# Patient Record
Sex: MALE | Race: WHITE | Employment: FULL TIME | URBAN - METROPOLITAN AREA
[De-identification: names, ages, dates, MRNs, and addresses within clinical notes are randomized per-mention and may not be internally consistent; named-entity substitution may affect disease eponyms.]

---

## 2022-04-06 ENCOUNTER — HOSPITAL ENCOUNTER (EMERGENCY)
Age: 62
Discharge: HOME | End: 2022-04-06
Payer: COMMERCIAL

## 2022-04-06 VITALS
OXYGEN SATURATION: 95 % | RESPIRATION RATE: 18 BRPM | DIASTOLIC BLOOD PRESSURE: 90 MMHG | SYSTOLIC BLOOD PRESSURE: 150 MMHG | HEART RATE: 82 BPM | TEMPERATURE: 98.3 F

## 2022-04-06 DIAGNOSIS — Z23 NEED FOR TETANUS BOOSTER: ICD-10-CM

## 2022-04-06 DIAGNOSIS — S01.01XA LACERATION OF SCALP, INITIAL ENCOUNTER: Primary | ICD-10-CM

## 2022-04-06 PROCEDURE — 90471 IMMUNIZATION ADMIN: CPT | Performed by: PHYSICIAN ASSISTANT

## 2022-04-06 PROCEDURE — 2500000003 HC RX 250 WO HCPCS: Performed by: PHYSICIAN ASSISTANT

## 2022-04-06 PROCEDURE — 12001 RPR S/N/AX/GEN/TRNK 2.5CM/<: CPT

## 2022-04-06 PROCEDURE — 6360000002 HC RX W HCPCS: Performed by: PHYSICIAN ASSISTANT

## 2022-04-06 PROCEDURE — 99202 OFFICE O/P NEW SF 15 MIN: CPT

## 2022-04-06 PROCEDURE — 90715 TDAP VACCINE 7 YRS/> IM: CPT | Performed by: PHYSICIAN ASSISTANT

## 2022-04-06 RX ORDER — AMLODIPINE BESYLATE 10 MG/1
10 TABLET ORAL DAILY
COMMUNITY

## 2022-04-06 RX ORDER — ALBUTEROL SULFATE 90 UG/1
2 AEROSOL, METERED RESPIRATORY (INHALATION) EVERY 6 HOURS PRN
COMMUNITY

## 2022-04-06 RX ORDER — VALSARTAN 320 MG/1
320 TABLET ORAL DAILY
COMMUNITY

## 2022-04-06 RX ORDER — PRAVASTATIN SODIUM 10 MG
40 TABLET ORAL DAILY
COMMUNITY

## 2022-04-06 RX ORDER — LIDOCAINE HYDROCHLORIDE AND EPINEPHRINE 10; 10 MG/ML; UG/ML
20 INJECTION, SOLUTION INFILTRATION; PERINEURAL ONCE
Status: COMPLETED | OUTPATIENT
Start: 2022-04-06 | End: 2022-04-06

## 2022-04-06 RX ADMIN — LIDOCAINE HYDROCHLORIDE,EPINEPHRINE BITARTRATE 20 ML: 10; .01 INJECTION, SOLUTION INFILTRATION; PERINEURAL at 15:48

## 2022-04-06 RX ADMIN — TETANUS TOXOID, REDUCED DIPHTHERIA TOXOID AND ACELLULAR PERTUSSIS VACCINE, ADSORBED 0.5 ML: 5; 2.5; 8; 8; 2.5 SUSPENSION INTRAMUSCULAR at 15:56

## 2022-04-06 ASSESSMENT — PAIN - FUNCTIONAL ASSESSMENT: PAIN_FUNCTIONAL_ASSESSMENT: 0-10

## 2022-04-06 ASSESSMENT — PAIN SCALES - GENERAL
PAINLEVEL_OUTOF10: 2
PAINLEVEL_OUTOF10: 2

## 2022-04-06 ASSESSMENT — PAIN DESCRIPTION - DESCRIPTORS: DESCRIPTORS: ACHING

## 2022-04-06 ASSESSMENT — PAIN DESCRIPTION - PAIN TYPE: TYPE: ACUTE PAIN

## 2022-04-06 ASSESSMENT — PAIN DESCRIPTION - LOCATION: LOCATION: HEAD

## 2022-04-06 NOTE — ED PROVIDER NOTES
[id number] [facility] [place]  Department of Emergency Medicine   ED  Encounter Note  Admit Date/RoomTime: 2022  3:18 PM  ED Room:   NAME: Jonathan Dave  : 1960  MRN: [id number]     Chief Complaint:  Laceration (on top of his head)    HISTORY OF LITTLE Anguiano is a 65 y.o. male who presents to the ED with a laceration to the top of his head. This is a work related injury. Patient states he was moving a log splitter with a carlos. Kicked out at him and the carlos came back and hit him to the top of his head. He presents with a laceration to top of his head on the right side. Patient denies any loss of consciousness. He denies headache. States he was just stunned for less than a minute. Denies any dizziness. Denies any neck pain. Patient is not on blood thinners. He only complains of localized tenderness around the laceration. He rates it a 2 out of 10. ROS   Pertinent positives and negatives are stated within HPI, all other systems reviewed and are negative. Past Medical History:  has no past medical history on file. Surgical History:  has no past surgical history on file. Social History:  reports that he has been smoking. He has never used smokeless tobacco.    Family History: family history is not on file. Allergies: Penicillins    PHYSICAL EXAM   Oxygen Saturation Interpretation: Normal on room air analysis. ED Triage Vitals [22 1518]   BP Temp Temp Source Pulse Resp SpO2 Height Weight   -- 98.3 °F (36.8 °C) Infrared 82 18 95 % -- --       General:  NAD. Alert and Oriented. Well-appearing. Skin:  Warm, dry. No rashes. Head:  Normocephalic. Patient has a 3 cm linear laceration to the right parietal scalp. Eyes:  EOMI. Conjunctiva normal.  Pupils equal round and reactive to light. ENT:  Oral mucosa moist.  Airway patent. Neck:  Supple. Normal ROM. Respiratory:  No respiratory distress. No labored breathing.   Lungs clear without rales, rhonchi or wheezing. Cardiovascular:  Regular rate. No Murmur. No peripheral edema. Extremities warm and good color. Extremities:  Normal ROM. Nontender to palpation. Atraumatic. Back:  Normal ROM. Nontender to palpation. Neuro:  Alert and Oriented to person, place, time and situation. Normal LOC. Moves all extremities. Speech fluent. Psych:  Calm and Cooperative. Normal thought process. Normal judgement. Lab / Imaging Results   (All laboratory and radiology results have been personally reviewed by myself)  Labs:  No results found for this visit on 04/06/22. Imaging: All Radiology results interpreted by Radiologist unless otherwise noted. No orders to display       ED Course / Medical Decision Making     Medications   lidocaine-EPINEPHrine 1 %-[id number] injection 20 mL (has no administration in time range)   Tetanus-Diphth-Acell Pertussis (BOOSTRIX) injection 0.5 mL (has no administration in time range)        Re-examination:  4/6/22       Time:   Patients condition . Consult(s):   None    Procedure(s):  PROCEDURE NOTE FOR LACERATION REPAIR    PERFORMED BY EMORY DURHAM PA-C      TIME:  1540  LOCATION: Right parietal scalp  LENGTH: 3 cm  DEPTH:  Partial thickness   LOCAL ANESTHESIA: 2    ML of Lidocaine 1% with Epinephrine. PREP:  Cleansed with peroxide, since we have no saline in the department. And draped in a sterile fashion. Wound explored and found to have NO foreign body and NO tendon injury. CLOSURE:  Wound closed using stapler, 7 staples. Pt tolerated procedure well. Wound care instructions provided by FARIHA and discussed with patient at bedside. MDM:   Wound care provided. Tetanus updated. Work med paperwork completed.   Patient states he can go back to work tomorrow like normal.      Plan of Care/Counseling:  Physician Assistant on duty reviewed today's visit with the patient in addition to providing specific details for the plan of care and counseling regarding the diagnosis and prognosis. Questions are answered at this time and are agreeable with the plan. ASSESSMENT     1. Laceration of scalp, initial encounter    2. Need for tetanus booster      PLAN   Discharged home. Patient condition is good    New Medications     New Prescriptions    No medications on file     Electronically signed by LACIE Bedoya   DD: 4/6/22  **This report was transcribed using voice recognition software. Every effort was made to ensure accuracy; however, inadvertent computerized transcription errors may be present.   END OF ED PROVIDER NOTE       Franca Bedoya  04/06/22 1548

## 2023-04-27 RX ORDER — TIOTROPIUM BROMIDE INHALATION SPRAY 3.12 UG/1
SPRAY, METERED RESPIRATORY (INHALATION)
COMMUNITY
Start: 2020-10-19

## 2023-04-27 RX ORDER — TIOTROPIUM BROMIDE INHALATION SPRAY 3.12 UG/1
2 SPRAY, METERED RESPIRATORY (INHALATION) DAILY
Qty: 1 EACH | Refills: 1 | Status: CANCELLED | OUTPATIENT
Start: 2023-04-27